# Patient Record
Sex: MALE | Race: WHITE | NOT HISPANIC OR LATINO | Employment: FULL TIME | URBAN - METROPOLITAN AREA
[De-identification: names, ages, dates, MRNs, and addresses within clinical notes are randomized per-mention and may not be internally consistent; named-entity substitution may affect disease eponyms.]

---
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
## 2020-08-05 ENCOUNTER — OFFICE VISIT (OUTPATIENT)
Dept: URGENT CARE | Facility: URGENT CARE | Age: 20
End: 2020-08-05
Payer: COMMERCIAL

## 2020-08-05 VITALS
TEMPERATURE: 98.5 F | OXYGEN SATURATION: 98 % | HEIGHT: 68 IN | BODY MASS INDEX: 18.94 KG/M2 | DIASTOLIC BLOOD PRESSURE: 60 MMHG | WEIGHT: 125 LBS | RESPIRATION RATE: 20 BRPM | HEART RATE: 121 BPM | SYSTOLIC BLOOD PRESSURE: 110 MMHG

## 2020-08-05 DIAGNOSIS — F41.0 ANXIETY ATTACK: ICD-10-CM

## 2020-08-05 DIAGNOSIS — Z20.822 SUSPECTED COVID-19 VIRUS INFECTION: Primary | ICD-10-CM

## 2020-08-05 DIAGNOSIS — R07.89 CHEST DISCOMFORT: ICD-10-CM

## 2020-08-05 PROCEDURE — 99213 OFFICE O/P EST LOW 20 MIN: CPT | Performed by: NURSE PRACTITIONER

## 2020-08-05 PROCEDURE — 93000 ELECTROCARDIOGRAM COMPLETE: CPT | Performed by: NURSE PRACTITIONER

## 2020-08-05 ASSESSMENT — ENCOUNTER SYMPTOMS
ACTIVITY CHANGE: 0
SINUS PAIN: 0
ABDOMINAL PAIN: 0
WHEEZING: 0
PALPITATIONS: 0
SHORTNESS OF BREATH: 1
APPETITE CHANGE: 0
VOMITING: 0
FATIGUE: 0
TROUBLE SWALLOWING: 0
CONSTIPATION: 0
ABDOMINAL DISTENTION: 0
DIAPHORESIS: 0
HEADACHES: 0
SLEEP DISTURBANCE: 0
WEAKNESS: 0
FEVER: 0
SORE THROAT: 1
COUGH: 1
HEARTBURN: 0
DIZZINESS: 0
RHINORRHEA: 0
DIARRHEA: 0
CHEST TIGHTNESS: 1
MYALGIAS: 0
PARESTHESIAS: 0
LIGHT-HEADEDNESS: 0
ADENOPATHY: 0
SINUS PRESSURE: 0
CHILLS: 0
NAUSEA: 0
VOICE CHANGE: 0

## 2020-08-05 ASSESSMENT — MIFFLIN-ST. JEOR: SCORE: 1546.5

## 2020-08-06 NOTE — PROGRESS NOTES
Chief Complaint   Patient presents with     Urgent Care     URI     short of breath x 6 days, some tightness in chest. maybe slight cough starting.     SUBJECTIVE:  Jensen Sullivan is a 20 year old male who presents to the clinic today a chief complaint of shortness of breath, left sided chest discomfort, tightness, pressure, slight cough, mild sore throat, congestion, loss of smell. Had covid test done today. Patient has childhood exercise induced asthma, but no flares in years. He denies GERD, leg swelling, heart palpitations, dyspnea on exertion, dizziness, recent travel, dehydration. He is very worried about covid and his symptoms.    Past Medical History:   Diagnosis Date     Allergic rhinitis, cause unspecified 7/15/2004     Mild intermittent asthma      Unspecified otitis media     10/04, 5/06     No current outpatient medications on file prior to visit.  No current facility-administered medications on file prior to visit.     Social History     Tobacco Use     Smoking status: Never Smoker     Smokeless tobacco: Never Used   Substance Use Topics     Alcohol use: No     Alcohol/week: 0.0 standard drinks     Allergies   Allergen Reactions     Cats Difficulty breathing     Seasonal Allergies      Watery eyes, congestion, sneezing     Review of Systems   Constitutional: Negative for activity change, appetite change, chills, diaphoresis, fatigue and fever.   HENT: Positive for congestion and sore throat. Negative for ear discharge, ear pain, rhinorrhea, sinus pressure, sinus pain, trouble swallowing and voice change.    Respiratory: Positive for cough, chest tightness and shortness of breath. Negative for wheezing.    Cardiovascular: Positive for chest pain (pressure, discomfort). Negative for palpitations and peripheral edema.   Gastrointestinal: Negative for abdominal distention, abdominal pain, constipation, diarrhea, heartburn, nausea and vomiting.   Musculoskeletal: Negative for myalgias.   Skin: Negative for  "pallor and rash.   Neurological: Negative for dizziness, weakness, light-headedness, headaches and paresthesias.   Hematological: Negative for adenopathy.   Psychiatric/Behavioral: Negative for sleep disturbance.     /60   Pulse 121   Temp 98.5  F (36.9  C)   Resp 20   Ht 1.727 m (5' 8\")   Wt 56.7 kg (125 lb)   SpO2 98%   BMI 19.01 kg/m      Physical Exam  Vitals signs reviewed.   Constitutional:       General: He is in acute distress (appears anxious).      Appearance: Normal appearance.   HENT:      Head: Normocephalic and atraumatic.      Nose: Nose normal.      Mouth/Throat:      Mouth: Mucous membranes are moist.      Pharynx: Oropharynx is clear.   Eyes:      Extraocular Movements: Extraocular movements intact.      Conjunctiva/sclera: Conjunctivae normal.      Pupils: Pupils are equal, round, and reactive to light.   Neck:      Musculoskeletal: Normal range of motion and neck supple.   Cardiovascular:      Rate and Rhythm: Tachycardia present.      Pulses: Normal pulses.      Heart sounds: Normal heart sounds.   Pulmonary:      Effort: Pulmonary effort is normal. No respiratory distress.      Breath sounds: Normal breath sounds. No stridor. No wheezing, rhonchi or rales.   Chest:      Chest wall: No tenderness.   Musculoskeletal: Normal range of motion.      Right lower leg: No edema.      Left lower leg: No edema.   Skin:     General: Skin is warm and dry.      Findings: No erythema or rash.   Neurological:      General: No focal deficit present.      Mental Status: He is alert and oriented to person, place, and time.       EKG done in clinic read by me as normal sinus rhythm.    ASSESSMENT:    ICD-10-CM    1. Chest pain  R07.9 EKG 12-lead complete w/read - Clinics   2. Suspected COVID-19 virus infection  Z20.828    3. Anxiety attack  F41.0      PLAN:   Patient Instructions   COVID test pending from outside clinic  EKG normal in clinic  Lungs clear, vitals stable  No concerns for cardiac event, " heart attack, pulmonary emboli, etc.  Likely anxiety attack with life stressors / covid potential  Push warm fluids, tea with honey  Rest, relax, go on walks, talk to loved ones  Hold on albuterol, prednisone, no wheezing  ED if severe shortness of breath, chest pain, dizziness    Follow up with primary care provider with any problems, questions or concerns or if symptoms worsen or fail to improve. Patient agreed to plan and verbalized understanding.    Mindi Lagunas, DANISHA-[facility]

## 2022-11-10 NOTE — PATIENT INSTRUCTIONS
COVID test pending from outside clinic  EKG normal in clinic  Lungs clear, vitals stable  No concerns for cardiac event, heart attack, pulmonary emboli, etc.  Likely anxiety attack with life stressors / covid potential  Push warm fluids, tea with honey  Rest, relax, go on walks, talk to loved ones  Hold on albuterol, prednisone, no wheezing  ED if severe shortness of breath, chest pain, dizziness   Health Maintenance Due   Topic Date Due   • Hepatitis B Vaccine (For Physician/APC Discussion) (2 of 3 - 3-dose series) 07/27/2012   • Diabetes Foot Exam  10/03/2020   • Traditional Medicare- Medicare Wellness Visit  09/24/2022       Patient is due for topics listed above, he wishes to proceed with MWV (Medicare Wellness Visit), but is not proceeding with Immunization(s) Hep B and Diabetes Foot Exam at this time.

## 2023-06-10 ENCOUNTER — HOSPITAL ENCOUNTER (EMERGENCY)
Facility: CLINIC | Age: 23
Discharge: HOME | End: 2023-06-10
Admitting: NURSE PRACTITIONER
Payer: COMMERCIAL

## 2023-06-10 ENCOUNTER — APPOINTMENT (OUTPATIENT)
Dept: GENERAL RADIOLOGY | Facility: CLINIC | Age: 23
End: 2023-06-10
Attending: NURSE PRACTITIONER
Payer: COMMERCIAL

## 2023-06-10 VITALS
OXYGEN SATURATION: 97 % | HEIGHT: 68 IN | DIASTOLIC BLOOD PRESSURE: 84 MMHG | HEART RATE: 98 BPM | BODY MASS INDEX: 17.58 KG/M2 | WEIGHT: 116 LBS | RESPIRATION RATE: 16 BRPM | TEMPERATURE: 99.6 F | SYSTOLIC BLOOD PRESSURE: 121 MMHG
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
## 2023-06-10 DIAGNOSIS — Z23 RABIES, NEED FOR PROPHYLACTIC VACCINATION AGAINST: ICD-10-CM

## 2023-06-10 PROCEDURE — 250N000011 HC RX IP 250 OP 636: Performed by: NURSE PRACTITIONER

## 2023-06-10 PROCEDURE — 90675 RABIES VACCINE IM: CPT | Performed by: NURSE PRACTITIONER

## 2023-06-10 PROCEDURE — 96372 THER/PROPH/DIAG INJ SC/IM: CPT | Performed by: NURSE PRACTITIONER

## 2023-06-10 PROCEDURE — 99284 EMERGENCY DEPT VISIT MOD MDM: CPT | Mod: 25 | Performed by: NURSE PRACTITIONER

## 2023-06-10 PROCEDURE — 90471 IMMUNIZATION ADMIN: CPT | Performed by: NURSE PRACTITIONER

## 2023-06-10 PROCEDURE — 250N000013 HC RX MED GY IP 250 OP 250 PS 637: Performed by: NURSE PRACTITIONER

## 2023-06-10 PROCEDURE — 99284 EMERGENCY DEPT VISIT MOD MDM: CPT | Performed by: NURSE PRACTITIONER

## 2023-06-10 PROCEDURE — 90472 IMMUNIZATION ADMIN EACH ADD: CPT | Performed by: NURSE PRACTITIONER

## 2023-06-10 PROCEDURE — 90715 TDAP VACCINE 7 YRS/> IM: CPT | Performed by: NURSE PRACTITIONER

## 2023-06-10 PROCEDURE — 90375 RABIES IG IM/SC: CPT | Performed by: NURSE PRACTITIONER

## 2023-06-10 PROCEDURE — 73090 X-RAY EXAM OF FOREARM: CPT | Mod: LT

## 2023-06-10 RX ADMIN — Medication 1 ML: at 19:48

## 2023-06-10 RX ADMIN — AMOXICILLIN AND CLAVULANATE POTASSIUM 1 TABLET: 875; 125 TABLET, FILM COATED ORAL at 19:44

## 2023-06-10 RX ADMIN — CLOSTRIDIUM TETANI TOXOID ANTIGEN (FORMALDEHYDE INACTIVATED), CORYNEBACTERIUM DIPHTHERIAE TOXOID ANTIGEN (FORMALDEHYDE INACTIVATED), BORDETELLA PERTUSSIS TOXOID ANTIGEN (GLUTARALDEHYDE INACTIVATED), BORDETELLA PERTUSSIS FILAMENTOUS HEMAGGLUTININ ANTIGEN (FORMALDEHYDE INACTIVATED), BORDETELLA PERTUSSIS PERTACTIN ANTIGEN, AND BORDETELLA PERTUSSIS FIMBRIAE 2/3 ANTIGEN 0.5 ML: 5; 2; 2.5; 5; 3; 5 INJECTION, SUSPENSION INTRAMUSCULAR at 20:06

## 2023-06-10 RX ADMIN — RABIES IMMUNE GLOBULIN (HUMAN) 1050 UNITS: 300 INJECTION, SOLUTION INFILTRATION; INTRAMUSCULAR at 19:45

## 2023-06-10 ASSESSMENT — ACTIVITIES OF DAILY LIVING (ADL)
ADLS_ACUITY_SCORE: 33
ADLS_ACUITY_SCORE: 35

## 2023-06-10 NOTE — ED TRIAGE NOTES
Dog Bite Pt was bit by a dog he doesn't know while walking through a park 1 hr PTA. Puncture wound to L forearm. States he washed it with soap and water as soon as he could. Bleeding controlled. Pt reports that the owner of the dog said that the dog was vaccinated, but does not have a way to verify this.          Triage Assessment     Row Name 06/10/23 [id number]       Triage Assessment (Adult)    Airway WDL WDL       Respiratory WDL    Respiratory WDL WDL       Skin Circulation/Temperature WDL    Skin Circulation/Temperature WDL WDL       Cardiac WDL    Cardiac WDL WDL       Peripheral/Neurovascular WDL    Peripheral Neurovascular WDL WDL       Cognitive/Neuro/Behavioral WDL    Cognitive/Neuro/Behavioral WDL WDL

## 2023-06-10 NOTE — ED PROVIDER NOTES
ED Provider Note  [facility]      History     Chief Complaint   Patient presents with     Dog Bite     Pt was bit by a dog he doesn't know while walking through a park 1 hr PTA. Puncture wound to L forearm. States he washed it with soap and water as soon as he could. Bleeding controlled. Pt reports that the owner of the dog said that the dog was vaccinated, but does not have a way to verify this.     HPI  Jensen Sullivan is a 23 year old male past history of rhinitis, and asthma who presents emergency department following being bitten by a dog earlier today.  He reports he was walking approximately 1 hour prior to arrival, when he was bit on the left forearm.  The animal in question is unknown to the patient, he does report talk to the owner who stated that the dog's vaccination status was up-to-date and had been vaccinated for rabies.  Patient reports he did wash and irrigate the wound following being bitten.    At this time patient is unable to isolate and monitor the dog for the next 10 days.  Due to this we will move forward with rabies prophylaxis.  He denies any numbness or tingling to the left hand has full strength and is intact neurovascularly.    Past Medical History  Past Medical History:   Diagnosis Date     Allergic rhinitis, cause unspecified 7/15/2004     Mild intermittent asthma      Unspecified otitis media     10/04, 5/06     History reviewed. No pertinent surgical history.  amoxicillin-clavulanate (AUGMENTIN) 875-125 MG tablet      Allergies   Allergen Reactions     Cats Difficulty breathing     Seasonal Allergies      Watery eyes, congestion, sneezing     Family History  Family History   Problem Relation Age of Onset     Allergies Mother      Cerebrovascular Disease Maternal Grandfather         grandfather passed away 2003     Hypertension Maternal Grandfather         grandfather     Depression Other         grandfather     Respiratory Other         asthma - aunt and  "grandmother     Cancer Maternal Grandmother         CLL - passed away June 2006     Social History   Social History     Tobacco Use     Smoking status: Never     Smokeless tobacco: Never   Substance Use Topics     Alcohol use: No     Alcohol/week: 0.0 standard drinks of alcohol     Drug use: No         A medically appropriate review of systems was performed with pertinent positives and negatives noted in the HPI, and all other systems negative.    Physical Exam   BP: 121/84  Pulse: 98  Temp: 99.6  F (37.6  C)  Resp: 16  Height: 172.7 cm (5' 8\")  Weight: 52.6 kg (116 lb)  SpO2: 97 %  Physical Exam  Vitals and nursing note reviewed.   Constitutional:       Appearance: Normal appearance.   HENT:      Head: Normocephalic and atraumatic.      Right Ear: External ear normal.      Left Ear: External ear normal.      Nose: Nose normal.      Mouth/Throat:      Mouth: Mucous membranes are moist.   Eyes:      Conjunctiva/sclera: Conjunctivae normal.   Cardiovascular:      Rate and Rhythm: Normal rate.      Pulses: Normal pulses.           Radial pulses are 2+ on the right side and 2+ on the left side.   Pulmonary:      Effort: Pulmonary effort is normal.   Musculoskeletal:         General: Normal range of motion.      Cervical back: Normal range of motion.   Skin:     General: Skin is warm.      Comments: Patient with small skin puncture to distal left forearm.  This is associated with small skin tear.  No signs of deep tissue involvement.   Neurological:      Mental Status: He is alert and oriented to person, place, and time.   Psychiatric:         Mood and Affect: Mood normal.           ED Course, Procedures, & Data      Procedures               Results for orders placed or performed during the hospital encounter of 06/10/23   Radius/Ulna XR,  PA &LAT, left     Status: None    Narrative    EXAM: XR FOREARM LEFT 2 VIEWS  LOCATION: [facility]  DATE/TIME: 6/10/2023 7:25 PM " CDT    INDICATION: Dog bite, eval for fracture. Arm pain.  COMPARISON: None.      Impression    IMPRESSION: Within normal limits. No fracture. No foreign body.     Medications   rabies immune globulin 300 units/mL (HYPERAB) injection 1,050 Units (1,050 Units Intramuscular $Given 6/10/23 1945)   rabies vaccine,human diploid (IMOVAX) vaccine 1 mL (1 mL Intramuscular $Given 6/10/23 1948)   amoxicillin-clavulanate (AUGMENTIN) 875-125 MG per tablet 1 tablet (1 tablet Oral $Given 6/10/23 1944)   Tdap (tetanus-diphtheria-acell pertussis) (ADACEL) injection 0.5 mL (0.5 mLs Intramuscular $Given 6/10/23 2006)     Labs Ordered and Resulted from Time of ED Arrival to Time of ED Departure - No data to display  Radius/Ulna XR,  PA &LAT, left   Final Result   IMPRESSION: Within normal limits. No fracture. No foreign body.             Critical care was not performed.     Medical Decision Making  The patient's presentation was of moderate complexity (an acute complicated injury).    The patient's evaluation involved:  review of external note(s) from 3+ sources (Pediatrics, Family practice clinic notes and previous Urgent Care and ED summary)  ordering and/or review of 1 test(s) in this encounter (see separate area of note for details)  review of 2 test result(s) ordered prior to this encounter (CBC, CXR)    The patient's management necessitated moderate risk (prescription drug management including medications given in the ED).      Assessment & Plan    23 year old male w who presents to the emergency department following an incident where he was bitten on the left forearm by a dog.    Clinically, patient appears no acute distress and nontoxic. Vital signs without tachycardia or hypotension. Otherwise on examination, patient with small skin tear to the left forearm with superficial puncture wound.  The left arm, and distal hand are neurovascularly intact.  2+ pulses. The wound was thoroughly irrigated by ED technician.  Due to the  nature of being bitten by a medium size dog per patient report we did move forward with an x-ray of the forearm.  With the inability to monitor and isolate the dog for signs of rabies, and after discussion with patient did move forward with initiating the rabies immunoglobulin therapy as well as rabies vaccination.  In reviewing patient's immunizations, he was also due for a tetanus booster.    Personally interpreted the forearm x-rays, and this was negative for any signs of foreign body or acute fracture    Following irrigation of the wound, nursing staff did infiltrate the area with rabies immunoglobulin and administer the rest via IM injection.  He was also administered the first dose of the rabies vaccination as well as a tetanus update.  He was also given first dosing of Augmentin.    I discussed with the patient he can return to the emergency department or attempt to contact his clinic to see if they are able to administer the next dosing of his vaccination series.  I discussed returning on the 13th of the month as well as on the 17th and 24th of the month for these administrations.  Also he was given the first dose of antibiotics here in the Emergency Department and was given paper prescription to fill tomorrow for 7 days of antibiotic coverage.    I have reviewed the available findings, plan, need for close follow up, strict return/safety instructions with the patient.       Discharge Medication List as of 6/10/2023  8:19 PM      START taking these medications    Details   amoxicillin-clavulanate (AUGMENTIN) 875-125 MG tablet Take 1 tablet by mouth 2 times daily for 7 days, Disp-14 tablet, R-0, Local Print             Final diagnoses:   Dog bite, initial encounter   Rabies, need for prophylactic vaccination against       JOSSELYN Rosario CNP  [facility] EMERGENCY DEPARTMENT  6/10/2023     Zack Rosales APRN CNP  06/10/23 [id number]

## 2023-06-11 NOTE — DISCHARGE INSTRUCTIONS
TODAY'S VISIT:  - You were seen today for dog bite    - If you had any labs or imaging/radiology tests performed today, you should also discuss these tests with your usual provider.     - Emergency Department testing is focused on the potential causes of your symptoms that are the most dangerous possibilities and cannot cover every possibility. Based on the evaluation, it was deemed sufficiently safe to discharge and continue management through the clinics. Thus, follow-up is very important to assess for improvement/worsening, potential further testing, and potential treatment adjustments.     FOLLOW-UP:  Please make an appointment to follow up with:  - Please follow up with the ED for next vaccinations.   - In 3 days on the 13th, 17th and 24th of the month.    - Have your provider review the results from today's visit with you again to make sure no further follow-up or additional testing is needed based on those results.     PRESCRIPTIONS / MEDICATIONS:  - Augmentin     OTHER INSTRUCTIONS:  Wash your hands before touching any of your supplies:  Turn on the water.  Wet your hands and wrists.  Use liquid soap from a pump dispenser. Work up a lather.  Scrub your hands thoroughly.  Wash the wound with soap and water  Pat dry and cover with dressing    RETURN TO THE EMERGENCY DEPARTMENT  - Return to the Emergency Department at any time for any new or worsening symptoms or any concerns.  - Shaking chills or fever above 100.4 F ( 38 C)  - Bleeding that soaks the dressing  - Pink fluid weeping from the wound  - Increased drainage from the wound or drainage that is yellow, yellow-green, or smelly  - Increased swelling, pain, or redness in the skin around the wound  - A change in the color or size of the wound  - Increased fatigue  - Loss of appetite

## 2023-06-13 ENCOUNTER — HOSPITAL ENCOUNTER (EMERGENCY)
Facility: CLINIC | Age: 23
Discharge: HOME | End: 2023-06-13
Admitting: EMERGENCY MEDICINE
Payer: COMMERCIAL

## 2023-06-13 VITALS
HEART RATE: 76 BPM | HEIGHT: 68 IN | RESPIRATION RATE: 12 BRPM | BODY MASS INDEX: 17.61 KG/M2 | SYSTOLIC BLOOD PRESSURE: 111 MMHG | WEIGHT: 116.2 LBS | OXYGEN SATURATION: 98 % | DIASTOLIC BLOOD PRESSURE: 82 MMHG | TEMPERATURE: 98.7 F

## 2023-06-13 PROCEDURE — 90675 RABIES VACCINE IM: CPT | Performed by: EMERGENCY MEDICINE

## 2023-06-13 PROCEDURE — 90471 IMMUNIZATION ADMIN: CPT | Performed by: EMERGENCY MEDICINE

## 2023-06-13 PROCEDURE — 99281 EMR DPT VST MAYX REQ PHY/QHP: CPT | Mod: 25 | Performed by: EMERGENCY MEDICINE

## 2023-06-13 PROCEDURE — 250N000011 HC RX IP 250 OP 636: Performed by: EMERGENCY MEDICINE

## 2023-06-13 RX ADMIN — RABIES VIRUS STRAIN PM-1503-3M ANTIGEN (PROPIOLACTONE INACTIVATED) AND WATER 1 ML: KIT at 18:37

## 2023-06-13 NOTE — ED TRIAGE NOTES
Pt was bit by dog on Saturday, here for rabies followup.  Pt was told to come in for more shots.

## 2023-06-13 NOTE — DISCHARGE INSTRUCTIONS
You have been given the next injection in your rabies series. Continue to follow instructions from your original visit for timing of rabies vaccination series. Return to the emergency department for any concerns.

## 2023-06-17 ENCOUNTER — HOSPITAL ENCOUNTER (EMERGENCY)
Facility: CLINIC | Age: 23
Discharge: HOME | End: 2023-06-17
Admitting: EMERGENCY MEDICINE
Payer: COMMERCIAL

## 2023-06-17 VITALS
HEART RATE: 91 BPM | DIASTOLIC BLOOD PRESSURE: 74 MMHG | SYSTOLIC BLOOD PRESSURE: 116 MMHG | RESPIRATION RATE: 16 BRPM | TEMPERATURE: 98.3 F | OXYGEN SATURATION: 98 %

## 2023-06-17 PROCEDURE — 90675 RABIES VACCINE IM: CPT | Performed by: EMERGENCY MEDICINE

## 2023-06-17 PROCEDURE — 250N000011 HC RX IP 250 OP 636: Performed by: EMERGENCY MEDICINE

## 2023-06-17 PROCEDURE — 99281 EMR DPT VST MAYX REQ PHY/QHP: CPT | Mod: 25

## 2023-06-17 PROCEDURE — 90471 IMMUNIZATION ADMIN: CPT | Performed by: EMERGENCY MEDICINE

## 2023-06-17 RX ADMIN — Medication 1 ML: at 17:10

## 2023-06-17 NOTE — ED TRIAGE NOTES
Pt here for 3rd rabies vaccination. Denies needing to see MD. No symptoms. On abx.      Triage Assessment     Row Name 06/17/23 8427       Triage Assessment (Adult)    Airway WDL WDL       Respiratory WDL    Respiratory WDL WDL       Skin Circulation/Temperature WDL    Skin Circulation/Temperature WDL WDL       Cardiac WDL    Cardiac WDL WDL       Peripheral/Neurovascular WDL    Peripheral Neurovascular WDL WDL       Cognitive/Neuro/Behavioral WDL    Cognitive/Neuro/Behavioral WDL WDL

## 2023-06-24 ENCOUNTER — HOSPITAL ENCOUNTER (EMERGENCY)
Facility: CLINIC | Age: 23
Discharge: HOME | End: 2023-06-24
Admitting: PHYSICIAN ASSISTANT
Payer: COMMERCIAL

## 2023-06-24 VITALS
SYSTOLIC BLOOD PRESSURE: 126 MMHG | HEIGHT: 68 IN | BODY MASS INDEX: 17.52 KG/M2 | DIASTOLIC BLOOD PRESSURE: 78 MMHG | RESPIRATION RATE: 16 BRPM | TEMPERATURE: 98.2 F | OXYGEN SATURATION: 97 % | HEART RATE: 79 BPM | WEIGHT: 115.6 LBS

## 2023-06-24 PROCEDURE — 99281 EMR DPT VST MAYX REQ PHY/QHP: CPT | Mod: 25 | Performed by: PHYSICIAN ASSISTANT

## 2023-06-24 PROCEDURE — 90471 IMMUNIZATION ADMIN: CPT | Performed by: EMERGENCY MEDICINE

## 2023-06-24 PROCEDURE — 250N000011 HC RX IP 250 OP 636: Performed by: EMERGENCY MEDICINE

## 2023-06-24 PROCEDURE — 90675 RABIES VACCINE IM: CPT | Performed by: EMERGENCY MEDICINE

## 2023-06-24 RX ADMIN — Medication 1 ML: at 13:37

## 2023-06-24 ASSESSMENT — ACTIVITIES OF DAILY LIVING (ADL): ADLS_ACUITY_SCORE: 35

## 2023-06-24 NOTE — ED NOTES
Patient presented for his last rabies vaccine.  Vaccine administered into right deltoid.  Patient tolerated injection well.  He was dismissed from ED.

## 2023-06-24 NOTE — ED TRIAGE NOTES
Triage Assessment     Row Name 06/24/23 1118       Triage Assessment (Adult)    Airway WDL WDL       Respiratory WDL    Respiratory WDL WDL       Skin Circulation/Temperature WDL    Skin Circulation/Temperature WDL WDL       Cardiac WDL    Cardiac WDL WDL

## 2023-09-02 ENCOUNTER — HEALTH MAINTENANCE LETTER (OUTPATIENT)
Age: 23
End: 2023-09-02

## 2023-09-25 ASSESSMENT — ENCOUNTER SYMPTOMS
PALPITATIONS: 0
DYSURIA: 0
ABDOMINAL PAIN: 0
SHORTNESS OF BREATH: 0
PARESTHESIAS: 0
NERVOUS/ANXIOUS: 0
EYE PAIN: 0
CHILLS: 0
HEADACHES: 0
SORE THROAT: 0
JOINT SWELLING: 0
MYALGIAS: 0
FEVER: 0
HEMATURIA: 0
WEAKNESS: 0
HEARTBURN: 0
DIZZINESS: 0
FREQUENCY: 0
NAUSEA: 0
CONSTIPATION: 0
DIARRHEA: 0
ARTHRALGIAS: 0
HEMATOCHEZIA: 0
COUGH: 0

## 2023-09-25 ASSESSMENT — ASTHMA QUESTIONNAIRES: ACT_TOTALSCORE: 25

## 2023-10-02 ENCOUNTER — OFFICE VISIT (OUTPATIENT)
Dept: FAMILY MEDICINE | Facility: CLINIC | Age: 23
End: 2023-10-02
Payer: COMMERCIAL

## 2023-10-02 VITALS
WEIGHT: 113.3 LBS | DIASTOLIC BLOOD PRESSURE: 85 MMHG | TEMPERATURE: 97.7 F | BODY MASS INDEX: 17.17 KG/M2 | HEART RATE: 91 BPM | OXYGEN SATURATION: 98 % | HEIGHT: 68 IN | RESPIRATION RATE: 12 BRPM | SYSTOLIC BLOOD PRESSURE: 128 MMHG

## 2023-10-02 DIAGNOSIS — B35.1 ONYCHOMYCOSIS: ICD-10-CM

## 2023-10-02 DIAGNOSIS — Z13.1 SCREENING FOR DIABETES MELLITUS: ICD-10-CM

## 2023-10-02 DIAGNOSIS — Z11.4 SCREENING FOR HIV (HUMAN IMMUNODEFICIENCY VIRUS): ICD-10-CM

## 2023-10-02 DIAGNOSIS — D18.01 CHERRY ANGIOMA: ICD-10-CM

## 2023-10-02 DIAGNOSIS — Z11.3 ROUTINE SCREENING FOR STI (SEXUALLY TRANSMITTED INFECTION): ICD-10-CM

## 2023-10-02 DIAGNOSIS — Z00.00 VISIT FOR PREVENTIVE HEALTH EXAMINATION: Primary | ICD-10-CM

## 2023-10-02 PROCEDURE — 91320 SARSCV2 VAC 30MCG TRS-SUC IM: CPT | Performed by: FAMILY MEDICINE

## 2023-10-02 PROCEDURE — 90471 IMMUNIZATION ADMIN: CPT | Performed by: FAMILY MEDICINE

## 2023-10-02 PROCEDURE — 90480 ADMN SARSCOV2 VAC 1/ONLY CMP: CPT | Performed by: FAMILY MEDICINE

## 2023-10-02 PROCEDURE — 99385 PREV VISIT NEW AGE 18-39: CPT | Mod: 25 | Performed by: FAMILY MEDICINE

## 2023-10-02 PROCEDURE — 90686 IIV4 VACC NO PRSV 0.5 ML IM: CPT | Performed by: FAMILY MEDICINE

## 2023-10-02 PROCEDURE — 99213 OFFICE O/P EST LOW 20 MIN: CPT | Mod: 25 | Performed by: FAMILY MEDICINE

## 2023-10-02 ASSESSMENT — ENCOUNTER SYMPTOMS
NERVOUS/ANXIOUS: 0
ARTHRALGIAS: 0
SHORTNESS OF BREATH: 0
PARESTHESIAS: 0
HEMATOCHEZIA: 0
FREQUENCY: 0
NAUSEA: 0
SORE THROAT: 0
DIZZINESS: 0
MYALGIAS: 0
EYE PAIN: 0
HEMATURIA: 0
HEADACHES: 0
ABDOMINAL PAIN: 0
COUGH: 0
HEARTBURN: 0
CHILLS: 0
CONSTIPATION: 0
WEAKNESS: 0
FEVER: 0
JOINT SWELLING: 0
DIARRHEA: 0
PALPITATIONS: 0
DYSURIA: 0

## 2023-10-02 ASSESSMENT — PAIN SCALES - GENERAL: PAINLEVEL: NO PAIN (0)

## 2023-10-02 NOTE — PROGRESS NOTES
Assessment/Plan.    Preventive.  See below orders for screening tests and immunizations.    Skin lesion of right hand.  Consistent with cherry hemangioma.  Reassurance.    Skin lesion near left knee.  Suspect viral wart.  Discussed cryotherapy today versus topical salicylic acid.  Patient chose to try salicylic acid.    Onychomycosis, bilateral feet.  Discussed observation versus topical therapy versus oral terbinafine.  Reviewed limited effectiveness of all treatments.  Patient elected to try topical terbinafine.    HIV prevention.  Patient appears to be a good candidate for PrEP.  We will check baseline labs today.    F/u plan to be determined once results return.    Orders Placed This Encounter   Procedures    INFLUENZA VACCINE IM > 6 MONTHS VALENT IIV4 (AFLURIA/FLUZONE)    COVID-19 12+ (2023-24) (PFIZER)    HIV Antigen Antibody Combo    Treponema Abs w Reflex to RPR and Titer    Hepatitis C antibody    Hepatitis B Surface Antibody    Hepatitis B surface antigen    Basic metabolic panel  (Ca, Cl, CO2, Creat, Gluc, K, Na, BUN)     ----  Chief complaint: Physical    Social History     Social History Narrative    Updated 10/2/2023: Grew up in [place].  Lives alone.  No pets.  Works in IT at [employer].     Patient has been advised of [employer] billing: yes.    Social History     Tobacco Use    Smoking status: Never    Smokeless tobacco: Never   Vaping Use    Vaping Use: Never used   Substance Use Topics    Alcohol use: Yes     Alcohol/week: 5.0 standard drinks of alcohol     Types: 5 Standard drinks or equivalent per week    Drug use: Yes     Comment: edible cannabis       History   Sexual Activity    Sexual activity: Never     Comment: 2015: thinking about coming out soon     Denies concerns regarding edible cannabis use.    Skin lesion of right hand.  Noticed it spring 2023.  Slow stable.    Possible wart near left knee.  Noticed this 10 months ago.    Bilateral toenail fungus.  Not painful.  At one point, left  "first toenail fell off.    HIV prevention.  Patient is interested in starting PrEP.  He has never taken this before.  He is sexually active with male partners.  Uses condoms consistently with anal sex.  Denies a history of STI.  Denies a history of HIV or viral hepatitis.  Denies a history of renal dysfunction or low bone density.    Exam  /85   Pulse 91   Temp 97.7  F (36.5  C) (Temporal)   Resp 12   Ht 1.719 m (5' 7.68\")   Wt 51.4 kg (113 lb 4.8 oz)   SpO2 98%   BMI 17.39 kg/m      oropharynx without abnormal masses  no cervical adenopathy  lung fields CTAB  heart with regular rate and rhythm, no murmurs  no lower leg edema    Red macule on dorsum of right hand, 1 to 2 mm in diameter.    Mildly raised tan lesion anteriorly near left knee.  5 to 10 mm in diameter.    Dystrophic toenails bilaterally.  "

## 2023-10-02 NOTE — PROGRESS NOTES
SUBJECTIVE:   CC: Jensen is an 23 year old who presents for preventative health visit.       10/2/2023     2:46 PM   Additional Questions   Roomed by Trish Lay       Healthy Habits:     Getting at least 3 servings of Calcium per day:  Yes    Bi-annual eye exam:  NO    Dental care twice a year:  Yes    Sleep apnea or symptoms of sleep apnea:  None    Diet:  Regular (no restrictions)    Frequency of exercise:  4-5 days/week    Duration of exercise:  15-30 minutes    Taking medications regularly:  Yes      Today's PHQ-2 Score:       10/2/2023     2:38 PM   PHQ-2 ( 1999 [employer])   Q1: Little interest or pleasure in doing things 0   Q2: Feeling down, depressed or hopeless 0   PHQ-2 Score 0   Q1: Little interest or pleasure in doing things Not at all   Q2: Feeling down, depressed or hopeless Not at all   PHQ-2 Score 0           {Add if <65 person on Medicare  - Required Questions (Optional):788676}  {Outside tests to abstract? :055708}    {additional problems to add (Optional):742092}  Have you ever done Advance Care Planning? (For example, a Health Directive, POLST, or a discussion with a medical provider or your loved ones about your wishes): No, advance care planning information given to patient to review.  Patient plans to discuss their wishes with loved ones or provider.      Social History     Tobacco Use    Smoking status: Never    Smokeless tobacco: Never   Substance Use Topics    Alcohol use: Yes     Comment: social     {Rooming staff  Click this link to complete the Prescreen if response below is not for today's visit  Alcohol Use Prescreen >3 drinks/day or > 7 drinks/week.  If the prescreen question answer is YES, complete the full AUDIT  :920658}        9/25/2023     8:49 PM   Alcohol Use   Prescreen: >3 drinks/day or >7 drinks/week? No          No data to display                Last PSA: No results found for: PSA    Reviewed orders with patient. Reviewed health maintenance and updated orders accordingly  "- { :123462}  {Chronicprobdata (optional):132616}    Reviewed and updated as needed this visit by clinical staff   Tobacco  Allergies  Meds              Reviewed and updated as needed this visit by Provider                 {HISTORY OPTIONS (Optional):748133}    Review of Systems   Constitutional:  Negative for chills and fever.   HENT:  Negative for congestion, ear pain, hearing loss and sore throat.    Eyes:  Negative for pain and visual disturbance.   Respiratory:  Negative for cough and shortness of breath.    Cardiovascular:  Negative for chest pain, palpitations and peripheral edema.   Gastrointestinal:  Negative for abdominal pain, constipation, diarrhea, heartburn, hematochezia and nausea.   Genitourinary:  Negative for dysuria, frequency, genital sores, hematuria, impotence, penile discharge and urgency.   Musculoskeletal:  Negative for arthralgias, joint swelling and myalgias.   Skin:  Negative for rash.   Neurological:  Negative for dizziness, weakness, headaches and paresthesias.   Psychiatric/Behavioral:  Negative for mood changes. The patient is not nervous/anxious.      {MALE ROS (Optional):734028}    OBJECTIVE:   /85   Pulse 91   Temp 97.7  F (36.5  C) (Temporal)   Resp 12   Ht 1.719 m (5' 7.68\")   Wt 51.4 kg (113 lb 4.8 oz)   SpO2 98%   BMI 17.39 kg/m      Physical Exam  {Exam Choices (Optional):000682}    {Diagnostic Test Results (Optional):739050}    ASSESSMENT/PLAN:   {Diag Picklist:376702}    {Patient advised of split billing (Optional):657659}      COUNSELING:   {MALE COUNSELING MESSAGES:486018::\"Reviewed preventive health counseling, as reflected in patient instructions\"}        He reports that he has never smoked. He has never used smokeless tobacco.      {Counseling Resources  [place] Preventive Services Task Force  Cholesterol Screening  Health diet/nutrition  Pooled Cohorts Equation Calculator  [place]DA's MyPlate  ASA Prophylaxis  Lung CA Screening  Osteoporosis prevention/bone " health :865942}  {Prostate Cancer Screening  Consider for men 55-69 per guidance from USPSTF :446321}    Nicholas Gao MD  [facility]

## 2023-11-16 ENCOUNTER — E-VISIT (OUTPATIENT)
Dept: URGENT CARE | Facility: CLINIC | Age: 23
End: 2023-11-16
Payer: COMMERCIAL

## 2023-11-16 DIAGNOSIS — B30.9 VIRAL CONJUNCTIVITIS: Primary | ICD-10-CM

## 2023-11-16 PROCEDURE — 99421 OL DIG E/M SVC 5-10 MIN: CPT | Performed by: PHYSICIAN ASSISTANT

## 2023-11-16 NOTE — PATIENT INSTRUCTIONS
Jensen Sullivan,    Your photo and description of symptoms are consistent with pink eye caused by a virus. This can cause redness, irritation and itching in your eye as well as tearing and intermediate thickened discharge. Your eyes may even be stuck shut when when you wake up in the morning. Your eyelids may also become swollen. You'll be contagious while you have tearing and crusting in your eyes, generally 7-10 days. Wash your hands frequently and avoid touching your eyes to prevent spreading to others. You may use over the counter lubricating eye drops to help ease your symptoms. Allergy eye drops such as Patanol (olopatadine) or Zaditor (ketotifen) will help to control the itching. Avoid redness reducing eye drops for an extended period of time as these can cause a rebound and make the redness and irritation return when you stop using the drops. Cool packs on your eyes can help with irritation and swelling.     If your symptoms are getting worse or not improving by the 10th day of symptoms, be seen in person for further evaluation.    You'll be feeling better soon!    Arielle Hernandez PA-C  [facility]    Pinkeye: Care Instructions  Overview     Pinkeye is redness and swelling of the eye surface and the conjunctiva (the lining of the eyelid and the covering of the white part of the eye). Pinkeye is also called conjunctivitis. Pinkeye is often caused by infection with bacteria or a virus. Dry air, allergies, smoke, and chemicals are other common causes.  Pinkeye often gets better on its own in 7 to 10 days. Antibiotics only help if the pinkeye is caused by bacteria. Pinkeye caused by infection spreads easily. If an allergy or chemical is causing pinkeye, it will not go away unless you can avoid whatever is causing it.  Follow-up care is a key part of your treatment and safety. Be sure to make and go to all appointments, and call your doctor if you are having problems. It's also a good idea to  know your test results and keep a list of the medicines you take.  How can you care for yourself at home?  Wash your hands often. Always wash them before and after you treat pinkeye or touch your eyes or face.  Use moist cotton or a clean, wet cloth to remove crust. Wipe from the inside corner of the eye to the outside. Use a clean part of the cloth for each wipe.  Put cold or warm wet cloths on your eye a few times a day if the eye hurts.  Do not wear contact lenses or eye makeup until the pinkeye is gone. Throw away any eye makeup you were using when you got pinkeye. Clean your contacts and storage case. If you wear disposable contacts, use a new pair when your eye has cleared and it is safe to wear contacts again.  If the doctor gave you antibiotic ointment or eyedrops, use them as directed. Use the medicine for as long as instructed, even if your eye starts looking better soon. Keep the bottle tip clean, and do not let it touch the eye area.  To put in eyedrops or ointment:  Tilt your head back, and pull your lower eyelid down with one finger.  Drop or squirt the medicine inside the lower lid.  Close your eye for 30 to 60 seconds to let the drops or ointment move around.  Do not touch the ointment or dropper tip to your eyelashes or any other surface.  Do not share towels, pillows, or washcloths while you have pinkeye.  When should you call for help?   Call your doctor now or seek immediate medical care if:    You have pain in your eye, not just irritation on the surface.     You have a change in vision or loss of vision.     You have an increase in discharge from the eye.     Your eye has not started to improve or begins to get worse within 48 hours after you start using antibiotics.     Pinkeye lasts longer than 7 days.   Watch closely for changes in your health, and be sure to contact your doctor if you have any problems.  Where can you learn more?  Go to https://www.healthwise.net/patiented  Enter Y392 in  "the search box to learn more about \"Taz: Care Instructions.\"  Current as of: June 6, 2023               Content Version: 13.8 2006-2023 [employer].   Care instructions adapted under license by your healthcare professional. If you have questions about a medical condition or this instruction, always ask your healthcare professional. [employer] disclaims any warranty or liability for your use of this information.      "

## 2024-12-28 ENCOUNTER — HEALTH MAINTENANCE LETTER (OUTPATIENT)
Age: 24
End: 2024-12-28

## 2025-08-28 ENCOUNTER — OFFICE VISIT (OUTPATIENT)
Dept: AUDIOLOGY | Facility: CLINIC | Age: 25
End: 2025-08-28
Payer: COMMERCIAL

## 2025-08-28 DIAGNOSIS — Z86.69 HISTORY OF RECURRENT EAR INFECTION: ICD-10-CM

## 2025-08-28 DIAGNOSIS — H93.13 TINNITUS OF BOTH EARS: Primary | ICD-10-CM

## 2025-08-28 DIAGNOSIS — H92.03 EAR PAIN, BILATERAL: ICD-10-CM
